# Patient Record
(demographics unavailable — no encounter records)

---

## 2024-10-18 NOTE — DISCUSSION/SUMMARY
[FreeTextEntry1] : This  friendly patient began transitioning in May 2022 She has been on hormonal therapy consistently since 2022 She has been in therapy and was diagnosed with Gender Dysphoria She is concerned about certain facial features that appear masculine She desires facial feminization surgery and is followed by a Transgender team The following facial features appear masculine and will need to be modified in order for her to achieve a more feminine appearance: -Brow -Nose -Jawline -Lip upper -Neck   PSxHx:  None   PMedHx: none   Medications: -Estrogen 3mg -Spironolactone 200mg QD -Progesterone 100mg   FH: noncontributory   SH: occasional marijuana use,  no secondary tobacco use,   ROS: General health / Constitutional      no fever, no chills, no unusual weight changes, no energy level changes, no night sweats Skin       No color or pigmentation changes, no pruritis, no rashes, no ulcers, Hair       No changes in color, texture,  distribution, loss Nails       No color changes, brittleness, infection Head       No headaches, no new jaw pain Eyes       Good visual acuity, no color blindness, no corrective lenses, no photophobia, no diplopia, no blurred vision, no infection, pain, no medications, Ear      no tinnitus, no discharge, no pain, no medications Nose      no epistaxis, no rhinorrhea, no rhinitis, no pain, Mouth & Throat      no gingivitis, no gingival bleeding, no ulcers, no voice changes, no changes in oral mucosa or tongue Neck      no stiffness, no pain, no lymphadenitis, no thyroid disorders, Respiratory      no cough, no dyspnea, no wheezing,  no chest pain, cyanosis, no pneumonia, no asthma, Cardiovascular      no chest pain, no palpitations, no irregular rhythm, no tachycardia, no bradycardia, no heart failure, no dyspnea on exertion (PUGA), no edema Gastrointestinal      no nausea / vomiting, no dysphagia, no reflux / GERD, no abdominal pain, no jaundice Musculoskeletal      no pain in muscles, bones, or joints; no fractures, no dislocations, no muscular weakness, no atrophy Neurological      no paresis, no paralysis, no paresthesia, no seizures, no dizziness, no syncope, no ataxia, no tremor Psychological      no childhood behavioral problems, no irritability, no sleep changes Hematological      no anemia, no bruising, no bleeding tendencies,   PHYSICAL EXAM General WDWN, in no distress,  A & O x 3 (person, place, time) HEENT Head: AT/NC (atraumatic, normocephalic), including TMJ, sensory and motor; High forehead with recessed hairline, Prominent, bossed brow (Type III) and lateral orbital rim Eyes: EOMI, PERRLA Ears: exterior, nl hearing, Nose: thick skin, wide nasal dorsum, bulbous nasal tip with acute nasiolabial angle intranasal exam showed enlarged turbinates and deviated caudal septum Throat & mouth: gd palate elevation, nl tongue mobility, nl tonsil size Prominent mandibular angle with active masseter, widened lower face Wide, boxy chin Thin lips Hypoplastic malar and maxillary regions   Neck: no masses, nl pulses, excess submental fat with neck ptosis and lack of cervical-mental angle prominent tracheal bulge ("Kirit's Apple")   We had a 45 minute meeting with the patient discussing diagnosis and medical management issues and outcomes.  FFS: -Brow lift -frontal sinus setback -supraorbital brow recontouring -mandibular angle reduction b/l -chin narrowing -rhinoplasty, SMR, cartilage grafting -Submental fat excision and platysmaplasty (neck tightening)   Needs a 3D CT scan and Virtual Surgical Planning She will need to provide a letter from her therapist and hormone provider   CPT 25225: Frontal sinus setback CPT 72187: Bilateral browlift, Hairline lowering CPT 83390: Supraorbital contouring CPT 51771 Mandibular angle resection CPT 24147: Osseous genioplasty CPT 96297: Septorhinoplasty CPT 73912: Tracheal Shave CPT 72797: Platysmaplasty, Necklift CPT 87342: Upper lip augmentation CPT 08879-46: Bilateral cheek implants  The following feature modifications will be requested based on the below medical necessity reasons:   42673 (Frontal sinus setback): Previous exposure to testosterone has led this patient to have a male appearing forehead with a more bossed shaped; this is opposed to a female appearing forehead that is more flat. A frontal sinus setback procedure will reshape the anterior wall of the frontal sinus by pushing back the bone and change the contour from convex (male-shape) to flat (female-shape). This surgical change will create a more flattened feminine brow appearance.  37463 (Browlift): Previous exposure to testosterone has led to the patient having a male M-shaped hairline as opposed to a female upside-down U-shaped hairline. Her receded hairline also creates a high, broad male appearing forehead. Her low set eyebrows on top of her orbital roof rim give her a lewis, male-appearing look. Both of these male traits: a high hairline and low-set brows are causing her intense feelings of dysphoria. She would benefit from bilateral browlift and hairline lowering procedures. Raising her lateral eyebrow with a bilateral browlift will create a more female appearance. She has stressed a strong desire to wear her own natural hair and does not want to always have to wear a wig to cover up her male features.  09292 (Supraorbital bar contouring): This patient has orbital lateral hooding or overhang of her lateral frontal bone which is typically associated with the male skull and orbits. Supraorbital contouring of this lateral orbital region with a pineapple gabriela will correct this male feature that is causing this patient dysphoria. These procedures also allows us to do a success browlift procedure since the overhang of bone can inhibit the upper movement of the brow and the removal of this allows for an effective lift.  49748 (mandibular angle resection/reduction): This patient has an angular, more boxy jaw which results in male appearing lower face. She also has increased lower facial width related to her mandibular angle lateral projection. Mandibular angle resection/reduction will create a more feminine triangular jaw. By having a mandibular angle reduction, the lower facial width is narrowed and this will create a V-shaped, feminine appearance of the jawline when viewed from the front.  99793 (osseous genioplasty): This patient has a wide, large chin that contributes to her feelings of gender dysphoria and being mis-gendered in public. The patient would benefit from an osseous genioplasty that narrows and shortens the chin. The osseous genioplasty will help create a more feminine and more, slender chin.   83440 (Rhinoplasty): This patients nose has characteristics of a male nose. The male nose is often larger and wider with a more bulbous nasal tip. These male nasal features make her feel masculine which exacerbates her gender dysphoria. A rhinoplasty would be beneficial to feminize her nose by creating a smaller nose and more delicate, softer nasal tip. The lateral dorsal shape will also be feminized by the rhinoplasty. This patients nasal septum is shaped like a male septum. The septum is the supportive pole that holds up the structure of the nasal pyramid. In this case the septum will also be used to provide cartilage tissue necessary for nasal grafts. This septoplasty is required to modify the septum to create a straight nose with good functional breathing while taking away the characteristics of a male nose.  91539 (Platysmaplasty): With prolonged testosterone exposure, the submental region will appear full and ptotic. This patient has a full and ptotic submental and neck region which is associated with a male-appearing neck. The female neck is slender and tight. The platymaplasty, performed after submental fat excision, will help create a slender and tight, female appearing neck. At times we also do a partial resection of the digastric muscle and excise the submandibular glands to create a better cervicomental angle.  91412 (Upper lip augmentation): Females are shown to have white lips than males, therefore, an upper lip augmentation will create a more feminine appearance for this patient as she has currently has hypoplastic lips. Lip augmentation is also a procedure that not all patients need but we deem that this procedure is necessary for this patient as it well help alleviate her gender dysphoria.   16520 (Tracheal shave or tracheolaryngoplasty): A prominent "Kirit's Apple" is a feature associated with males. Not all trans-women have a prominent "Kirit's Apple". 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 This is seen on lateral head position and when her head is raised to the maisha. It causes her intense feelings of dysphoria and it is a cause for misgendering. Therefore, the patient would benefit from a tracheal shave procedure which eliminates this prominent "Kirit's Apple" associated with male appearance.   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 She would benefit from implants lateral to the nose on both sides to establish a white appearance that softens the face so it will look less harsh.

## 2024-10-18 NOTE — DISCUSSION/SUMMARY
[FreeTextEntry1] : This  friendly patient began transitioning in May 2022 She has been on hormonal therapy consistently since 2022 She has been in therapy and was diagnosed with Gender Dysphoria She is concerned about certain facial features that appear masculine She desires facial feminization surgery and is followed by a Transgender team The following facial features appear masculine and will need to be modified in order for her to achieve a more feminine appearance: -Brow -Nose -Jawline -Lip upper -Neck   PSxHx:  None   PMedHx: none   Medications: -Estrogen 3mg -Spironolactone 200mg QD -Progesterone 100mg   FH: noncontributory   SH: occasional marijuana use,  no secondary tobacco use,   ROS: General health / Constitutional      no fever, no chills, no unusual weight changes, no energy level changes, no night sweats Skin       No color or pigmentation changes, no pruritis, no rashes, no ulcers, Hair       No changes in color, texture,  distribution, loss Nails       No color changes, brittleness, infection Head       No headaches, no new jaw pain Eyes       Good visual acuity, no color blindness, no corrective lenses, no photophobia, no diplopia, no blurred vision, no infection, pain, no medications, Ear      no tinnitus, no discharge, no pain, no medications Nose      no epistaxis, no rhinorrhea, no rhinitis, no pain, Mouth & Throat      no gingivitis, no gingival bleeding, no ulcers, no voice changes, no changes in oral mucosa or tongue Neck      no stiffness, no pain, no lymphadenitis, no thyroid disorders, Respiratory      no cough, no dyspnea, no wheezing,  no chest pain, cyanosis, no pneumonia, no asthma, Cardiovascular      no chest pain, no palpitations, no irregular rhythm, no tachycardia, no bradycardia, no heart failure, no dyspnea on exertion (PUGA), no edema Gastrointestinal      no nausea / vomiting, no dysphagia, no reflux / GERD, no abdominal pain, no jaundice Musculoskeletal      no pain in muscles, bones, or joints; no fractures, no dislocations, no muscular weakness, no atrophy Neurological      no paresis, no paralysis, no paresthesia, no seizures, no dizziness, no syncope, no ataxia, no tremor Psychological      no childhood behavioral problems, no irritability, no sleep changes Hematological      no anemia, no bruising, no bleeding tendencies,   PHYSICAL EXAM General WDWN, in no distress,  A & O x 3 (person, place, time) HEENT Head: AT/NC (atraumatic, normocephalic), including TMJ, sensory and motor; High forehead with recessed hairline, Prominent, bossed brow (Type III) and lateral orbital rim Eyes: EOMI, PERRLA Ears: exterior, nl hearing, Nose: thick skin, wide nasal dorsum, bulbous nasal tip with acute nasiolabial angle intranasal exam showed enlarged turbinates and deviated caudal septum Throat & mouth: gd palate elevation, nl tongue mobility, nl tonsil size Prominent mandibular angle with active masseter, widened lower face Wide, boxy chin Thin lips Hypoplastic malar and maxillary regions   Neck: no masses, nl pulses, excess submental fat with neck ptosis and lack of cervical-mental angle prominent tracheal bulge ("Kirit's Apple")   We had a 45 minute meeting with the patient discussing diagnosis and medical management issues and outcomes.  FFS: -Brow lift -frontal sinus setback -supraorbital brow recontouring -mandibular angle reduction b/l -chin narrowing -rhinoplasty, SMR, cartilage grafting -Submental fat excision and platysmaplasty (neck tightening)   Needs a 3D CT scan and Virtual Surgical Planning She will need to provide a letter from her therapist and hormone provider   CPT 62136: Frontal sinus setback CPT 58386: Bilateral browlift, Hairline lowering CPT 99421: Supraorbital contouring CPT 95076 Mandibular angle resection CPT 22038: Osseous genioplasty CPT 66864: Septorhinoplasty CPT 52563: Tracheal Shave CPT 60167: Platysmaplasty, Necklift CPT 12900: Upper lip augmentation CPT 85939-11: Bilateral cheek implants  The following feature modifications will be requested based on the below medical necessity reasons:   19976 (Frontal sinus setback): Previous exposure to testosterone has led this patient to have a male appearing forehead with a more bossed shaped; this is opposed to a female appearing forehead that is more flat. A frontal sinus setback procedure will reshape the anterior wall of the frontal sinus by pushing back the bone and change the contour from convex (male-shape) to flat (female-shape). This surgical change will create a more flattened feminine brow appearance.  07328 (Browlift): Previous exposure to testosterone has led to the patient having a male M-shaped hairline as opposed to a female upside-down U-shaped hairline. Her receded hairline also creates a high, broad male appearing forehead. Her low set eyebrows on top of her orbital roof rim give her a lewis, male-appearing look. Both of these male traits: a high hairline and low-set brows are causing her intense feelings of dysphoria. She would benefit from bilateral browlift and hairline lowering procedures. Raising her lateral eyebrow with a bilateral browlift will create a more female appearance. She has stressed a strong desire to wear her own natural hair and does not want to always have to wear a wig to cover up her male features.  55603 (Supraorbital bar contouring): This patient has orbital lateral hooding or overhang of her lateral frontal bone which is typically associated with the male skull and orbits. Supraorbital contouring of this lateral orbital region with a pineapple gabriela will correct this male feature that is causing this patient dysphoria. These procedures also allows us to do a success browlift procedure since the overhang of bone can inhibit the upper movement of the brow and the removal of this allows for an effective lift.  30220 (mandibular angle resection/reduction): This patient has an angular, more boxy jaw which results in male appearing lower face. She also has increased lower facial width related to her mandibular angle lateral projection. Mandibular angle resection/reduction will create a more feminine triangular jaw. By having a mandibular angle reduction, the lower facial width is narrowed and this will create a V-shaped, feminine appearance of the jawline when viewed from the front.  35231 (osseous genioplasty): This patient has a wide, large chin that contributes to her feelings of gender dysphoria and being mis-gendered in public. The patient would benefit from an osseous genioplasty that narrows and shortens the chin. The osseous genioplasty will help create a more feminine and more, slender chin.   67249 (Rhinoplasty): This patients nose has characteristics of a male nose. The male nose is often larger and wider with a more bulbous nasal tip. These male nasal features make her feel masculine which exacerbates her gender dysphoria. A rhinoplasty would be beneficial to feminize her nose by creating a smaller nose and more delicate, softer nasal tip. The lateral dorsal shape will also be feminized by the rhinoplasty. This patients nasal septum is shaped like a male septum. The septum is the supportive pole that holds up the structure of the nasal pyramid. In this case the septum will also be used to provide cartilage tissue necessary for nasal grafts. This septoplasty is required to modify the septum to create a straight nose with good functional breathing while taking away the characteristics of a male nose.  68610 (Platysmaplasty): With prolonged testosterone exposure, the submental region will appear full and ptotic. This patient has a full and ptotic submental and neck region which is associated with a male-appearing neck. The female neck is slender and tight. The platymaplasty, performed after submental fat excision, will help create a slender and tight, female appearing neck. At times we also do a partial resection of the digastric muscle and excise the submandibular glands to create a better cervicomental angle.  40697 (Upper lip augmentation): Females are shown to have white lips than males, therefore, an upper lip augmentation will create a more feminine appearance for this patient as she has currently has hypoplastic lips. Lip augmentation is also a procedure that not all patients need but we deem that this procedure is necessary for this patient as it well help alleviate her gender dysphoria.   73909 (Tracheal shave or tracheolaryngoplasty): A prominent "Kirit's Apple" is a feature associated with males. Not all trans-women have a prominent "Kirit's Apple". 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 This is seen on lateral head position and when her head is raised to the maisha. It causes her intense feelings of dysphoria and it is a cause for misgendering. Therefore, the patient would benefit from a tracheal shave procedure which eliminates this prominent "Kirit's Apple" associated with male appearance.   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 She would benefit from implants lateral to the nose on both sides to establish a white appearance that softens the face so it will look less harsh.

## 2025-02-23 NOTE — DISCUSSION/SUMMARY
[FreeTextEntry1] :  Patient presented for preoperative consultation prior to facial feminization surgery for March 21 surgery We reviewed the virtual planning with the patient She desires to have the following facial features modified: -Brow -Nose -Jawline -Neck We reviewed these procedures and their risks   FH: noncontributory   SH: no secondary tobacco use,   Medications: -Estrogen 2mg PO -Spironolactone 100mg QD -Progesterone 100mg   ROS: General health / Constitutional      no fever, no chills, no unusual weight changes, no energy level changes, no night sweats Skin       No color or pigmentation changes, no pruritis, no rashes, no ulcers, Hair       No changes in color, texture,  distribution, loss Nails       No color changes, brittleness, infection Head       No headaches, no new jaw pain Eyes       Good visual acuity, no color blindness, no corrective lenses, no photophobia, no diplopia, no blurred vision, no infection, pain, no medications, Ear      no tinnitus, no discharge, no pain, no medications Nose      no epistaxis, no rhinorrhea, no rhinitis, no pain, Mouth & Throat      no gingivitis, no gingival bleeding, no ulcers, no voice changes, no changes in oral mucosa or tongue Neck      no stiffness, no pain, no lymphadenitis, no thyroid disorders, Respiratory      no cough, no dyspnea, no wheezing,  no chest pain, cyanosis, no pneumonia, no asthma, Cardiovascular      no chest pain, no palpitations, no irregular rhythm, no tachycardia, no bradycardia, no heart failure, no dyspnea on exertion (PUGA), no edema Gastrointestinal      no nausea / vomiting, no dysphagia, no reflux / GERD, no abdominal pain, no jaundice Musculoskeletal      no pain in muscles, bones, or joints; no fractures, no dislocations, no muscular weakness, no atrophy Neurological      no paresis, no paralysis, no paresthesia, no seizures, no dizziness, no syncope, no ataxia, no tremor Psychological      no childhood behavioral problems, no irritability, no sleep changes Hematological      no anemia, no bruising, no bleeding tendencies,   PHYSICAL EXAM General WDWN, in no distress,  A & O x 3 (person, place, time) HEENT Head: AT/NC (atraumatic, normocephalic), including TMJ, sensory and motor; Prominent brow and lateral orbital rim type III Eyes: EOMI, PERRLA Ears: exterior, nl hearing, Nose: slightly wide, bulbous nasal tip with acute nasiolabial angle intranasal exam showed enlarged turbinates and deviated caudal septum Throat & mouth: gd palate elevation, nl tongue mobility, nl tonsil size Prominent mandibular angle with active masseter Wide chin   Neck: no masses, nl pulses +excess submental fat    FFS: -Brow lift -frontal sinus setback -supraorbital brow recontouring -mandibular angle reduction b/l -chin narrowing -rhinoplasty, SMR, cartilage grafting -submental fat excision, platysmaplasty  Patient informed of the timing and risks moving forward. All patient questions were answered. Patient was given written instructions for care and follow up visit. We discussed the instructions and the patient confirmed an understanding of them.   Our team also spent additional time to determine best operative strategies for this patient using 3D imaging and morphing program ([employer]). We took 3D images and downloaded them to the software necessary for modifying each facial region to replicated surgical changes that are possible. This was used as a communication tool for the patients to determine which facial features were dysphoric and to determine how surgical modification will help with each feature. It was also used to determine how much modification would be best for this individual patient.  Desires: Smoothed out bossing across the jawline and added volume to the malar region to round out the cheeks (she is getting malar implants. Tightened up the submental area and narrowed the chin and projected it. Softened the jaw angle and narrowed the body of the mandible, brought it up slightly. Gave volume to the upper lip. For the nose I narrowed it, straightened it out a bit, and gave a slight curve to the dorsum. We de-projected the tip and rotated it upwards slightly, and defined the tip. She mostly expressed discontent with how bulbous and "heavy" the tip was.   Risks explained: Bleeding- It is possible, though unusual, to experience a bleeding episode during or after surgery. Should post-operative bleeding occur, it may require emergency treatment to drain accumulated blood or blood transfusion. Intra-operative blood transfusion may also be required. Do not take any aspirin or anti-inflammatory medications for ten days before or after surgery, as this may increase the risk of bleeding. Non-prescription herbs and dietary supplements can increase the risk of surgical bleeding. Hematoma can occur at any time following injury to the breast. If blood transfusions are necessary to treat blood loss, there is the risk of blood-related infections such as hepatitis and HIV (AIDS). Heparin medications that are used to prevent blood clots in veins can produce bleeding and decreased blood platelets.   Infection- Infection is unusual after surgery. Should an infection occur, additional treatment including antibiotics, hospitalization, or additional surgery may be necessary.   Change Skin Sensation- You may experience a diminished (or loss) of sensitivity of the skin of operative area. Partial or permanent loss of skin sensation can occur after surgery. Skin Contour Irregularities- Contour and shape irregularities may occur after surgery. Visible and palpable wrinkling may occur. Residual skin irregularities at the ends of the incisions or dog ears are always a possibility when there is excessive redundant skin. This may improve with time, or it can be surgically corrected.   Sutures- Most surgical techniques use deep sutures. You may notice these sutures after your surgery. Sutures may spontaneously poke through the skin, become visible or produce irritation that requires suture removal.   Skin Discoloration / Swelling- Some bruising and swelling normally occurs following surgery. The skin in or near the surgical site can appear either lighter or darker than surrounding skin. Although uncommon, swelling and skin discoloration may persist for long periods of time and, in rare situations, may be permanent.   Skin Sensitivity- Itching, tenderness, or exaggerated responses to hot or cold temperatures may occur after surgery. Usually this resolves during healing, but in rare situations it may be chronic.   Scarring- All surgery leaves scars, some more visible than others. Although good wound healing after a surgical procedure is expected, abnormal scars may occur within the skin and deeper tissues. Scars may be unattractive and of different color than the surrounding skin tone. Scar appearance may also vary within the same scar. Scars may be asymmetrical (appear different on the right and left side of the body). There is the possibility of visible marks in the skin from sutures. In some cases scars may require surgical revision or treatment.   Damage to Deeper Structures- There is the potential for injury to deeper structures including, nerves, blood vessels, muscles during any surgical procedure. The potential for this to occur varies according to the type of procedure being performed. Injury to deeper structures may be temporary or permanent.   Delayed Healing- Wound disruption or delayed wound healing is possible. Some areas of the skin may not heal normally and may take a long time to heal. Areas of skin tissue may die. This may require frequent dressing changes or further surgery to remove the non-healed tissue. Individuals who have decreased blood supply to tissue from past surgery or radiation therapy may be at increased risk for wound healing and poor surgical outcome. Smokers have a greater risk of skin loss and wound healing complications.   Fat Necrosis- Fatty tissue found deep in the skin might die. This may produce areas of firmness within the skin. Additional surgery to remove areas of fat necrosis may be necessary. There is the possibility of contour irregularities in the skin that may result from fat necrosis.   Allergic Reactions- In rare cases, local allergies to tape, suture material and glues, blood products, topical preparations or injected agents have been reported. Serious systemic reactions including shock (anaphylaxis) may occur in response to drugs used during surgery and prescription medicines. Allergic reactions may require additional treatment.  We spent a total of 45 minutes doing patient 3D image morphing with the patient, a full examination, and reviewing the CT virtual plan and risks with the patient. Patient seen and examined by me, Dr. Ruben Josue, personally. Treatment plan reviewed with patient by me. Physician extender was present for assistance only. I attest that the note reflects the visit and our care.     FH: noncontributory  SH: occasional marijuana use, no tobacco use,